# Patient Record
Sex: MALE | Race: WHITE | Employment: FULL TIME | URBAN - METROPOLITAN AREA
[De-identification: names, ages, dates, MRNs, and addresses within clinical notes are randomized per-mention and may not be internally consistent; named-entity substitution may affect disease eponyms.]

---

## 2022-02-08 ENCOUNTER — LAB (OUTPATIENT)
Dept: LAB | Facility: CLINIC | Age: 47
End: 2022-02-08
Payer: COMMERCIAL

## 2022-02-08 DIAGNOSIS — Z13.1 SCREENING FOR DIABETES MELLITUS: ICD-10-CM

## 2022-02-08 DIAGNOSIS — Z13.220 SCREENING FOR HYPERLIPIDEMIA: ICD-10-CM

## 2022-02-08 LAB
CHOLEST SERPL-MCNC: 281 MG/DL
FASTING STATUS PATIENT QL REPORTED: YES
FASTING STATUS PATIENT QL REPORTED: YES
GLUCOSE BLD-MCNC: 122 MG/DL (ref 70–99)
HDLC SERPL-MCNC: 55 MG/DL
LDLC SERPL CALC-MCNC: 170 MG/DL
NONHDLC SERPL-MCNC: 226 MG/DL
TRIGL SERPL-MCNC: 278 MG/DL

## 2022-02-08 PROCEDURE — 80061 LIPID PANEL: CPT

## 2022-02-08 PROCEDURE — 36415 COLL VENOUS BLD VENIPUNCTURE: CPT

## 2022-02-08 PROCEDURE — 82947 ASSAY GLUCOSE BLOOD QUANT: CPT

## 2022-05-20 ENCOUNTER — OFFICE VISIT (OUTPATIENT)
Dept: URGENT CARE | Facility: URGENT CARE | Age: 47
End: 2022-05-20
Payer: COMMERCIAL

## 2022-05-20 VITALS
OXYGEN SATURATION: 97 % | SYSTOLIC BLOOD PRESSURE: 126 MMHG | RESPIRATION RATE: 16 BRPM | WEIGHT: 218 LBS | TEMPERATURE: 99 F | HEART RATE: 85 BPM | DIASTOLIC BLOOD PRESSURE: 82 MMHG

## 2022-05-20 DIAGNOSIS — L03.116 LEFT LEG CELLULITIS: Primary | ICD-10-CM

## 2022-05-20 PROCEDURE — 99203 OFFICE O/P NEW LOW 30 MIN: CPT | Performed by: PHYSICIAN ASSISTANT

## 2022-05-20 RX ORDER — DOXYCYCLINE HYCLATE 100 MG
100 TABLET ORAL 2 TIMES DAILY
Qty: 14 TABLET | Refills: 0 | Status: SHIPPED | OUTPATIENT
Start: 2022-05-20 | End: 2022-05-27

## 2022-05-21 NOTE — PROGRESS NOTES
Assessment & Plan     1. Left leg cellulitis  Exam is consistent with L leg cellulitis.  Will treat with medication as below  Monitor for signs of erythema migrans and Lymes disease  Encouraged warm compresses  Discussed return precautions.   - doxycycline hyclate (VIBRA-TABS) 100 MG tablet; Take 1 tablet (100 mg) by mouth 2 times daily for 7 days  Dispense: 14 tablet; Refill: 0  .      Return in about 3 days (around 5/23/2022), or if symptoms worsen or fail to improve.    Diagnosis and treatment plan was reviewed with patient and/or family.   We went over any labs or imaging. Discussed worsening symptoms or little to no relief despite treatment plan to follow-up with PCP or return to clinic.  Patient verbalizes understanding. All questions were addressed and answered.     Raya Phillips PA-C  [facility]    CHIEF COMPLAINT:   Chief Complaint   Patient presents with     Tick Bite     Tick bite, possible infection - upper left thigh- pulled the tick out last night - the tick got missed up- may have a blood blister -- HE has a RED RING pattern around the bite.  He was up in [place] when it happened  - used alcohol when he pulled it out     Subjective     Ruiz is a 47 year old male who presents to clinic today for evaluation. Patient noted a tick attached to his left upper thigh yesterday.  He pulled the tick out last night using alcohol and a tweezers.  He attempted to take out the full tick, and believes that he got the whole body.  This morning he has noted a red ring around where the bite was with a lighter red ring around it.  He denies having fever or chills.  He has not had headache, joint pain or other rash.  Tick was small, but he is unsure if it was a deer tick or wood tick.      Past Medical History:   Diagnosis Date     Active smoker 10/5/2012     Depression 1990    txed with prozac and it helped     Hyperlipidemia LDL goal <160 2/16/2012     LBP (low back pain) 2/7/2012      Overweight (BMI 25.0-29.9) 2/16/2012     Prediabetes 2/16/2012     Rotator cuff syndrome of left shoulder 2/7/2012     Situational stress 2/7/2012     Past Surgical History:   Procedure Laterality Date     middle finger fracture       right knee fracture repair      [employer]     right wrist repair      biking     Social History     Tobacco Use     Smoking status: Current Every Day Smoker     Smokeless tobacco: Never Used   Substance Use Topics     Alcohol use: Yes     Comment: social lately, had some issues in past 6 months daily 3-5 beverages     Current Outpatient Medications   Medication     doxycycline hyclate (VIBRA-TABS) 100 MG tablet     doxycycline hyclate (VIBRA-TABS) 100 MG tablet     Acetaminophen (TYLENOL 8 HOUR PO)     ibuprofen (ADVIL,MOTRIN) 600 MG tablet     Pseudoephedrine HCl (SUDAFED 12 HOUR PO)     Psyllium (METAMUCIL SMOOTH TEXTURE) 63 % POWD     No current facility-administered medications for this visit.     Allergies   Allergen Reactions     Codeine Camsylate        10 point ROS of systems were all negative except for pertinent positives noted in my HPI.      Exam:   /82 (BP Location: Right arm, Patient Position: Chair, Cuff Size: Adult Regular)   Pulse 85   Temp 99  F (37.2  C) (Oral)   Resp 16   Wt 98.9 kg (218 lb)   SpO2 97%   Constitutional: healthy, alert and no distress  Head: Normocephalic, atraumatic.  Neck: neck is supple, no cervical lymphadenopathy or nuchal rigidity  Cardiovascular: RRR  Respiratory: CTA bilaterally, no rhonchi or rales  Skin: Left inner thigh has redness, swelling and dark erythema surrounding tick bite. Faint erythema spreading distally.   Neurologic: Speech clear, gait normal. Moves all extremities.    No results found for any visits on 05/20/22.

## 2022-08-10 ENCOUNTER — OFFICE VISIT (OUTPATIENT)
Dept: URGENT CARE | Facility: URGENT CARE | Age: 47
End: 2022-08-10
Payer: COMMERCIAL

## 2022-08-10 VITALS
OXYGEN SATURATION: 98 % | DIASTOLIC BLOOD PRESSURE: 84 MMHG | TEMPERATURE: 98 F | RESPIRATION RATE: 20 BRPM | HEART RATE: 78 BPM | SYSTOLIC BLOOD PRESSURE: 128 MMHG
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
## 2022-08-10 PROBLEM — R79.89 HIGH SERUM LOW-DENSITY LIPOPROTEIN (LDL): Status: ACTIVE | Noted: 2018-05-08

## 2022-08-10 PROCEDURE — 99212 OFFICE O/P EST SF 10 MIN: CPT | Performed by: NURSE PRACTITIONER

## 2022-08-10 PROCEDURE — 36415 COLL VENOUS BLD VENIPUNCTURE: CPT | Performed by: NURSE PRACTITIONER

## 2022-08-10 PROCEDURE — 86618 LYME DISEASE ANTIBODY: CPT | Performed by: NURSE PRACTITIONER

## 2022-08-10 NOTE — PATIENT INSTRUCTIONS
"A tick must be attached for at least 24-48 hours to transfer pathogens and cause an infection. Infection is very unlikely in the first 48-72 hours.  Infection is more likely if the tick is engorged or has been attached for over 72 hours.  Prophylactic antibiotics are recommended only if:  The tick was attached for 36 hours or more AND less than 72 hours has passed since the tick was removed  Treatment with a single dose of Doxycycline, 200mg (4mg/kg in children 8 years of age or older) by mouth with food.  -Do not give to children under 8 or pregnant or lactating women  If there is a contraindication to doxycycline, an alternate antibiotic is not recommended.  Guidelines state, \"If sections of the mouthparts of the tick remain in the skin, they should be left alone as they will normally be expelled spontaneously.\"  Ticks should be removed with tweezers or protected fingers pulling straight out gently and firmly using steady pressure.   Apply ice packs, may take benadryl as needed itch/irritation.  Ibuprofen for discomfort.   Watch for signs of secondary infection- redness, swelling, pain, colored discharge or fever.  Advised to watch for erythema migrans rash (circular red ringed rash), fever, chills, sweats, body aches, stiff neck, headache, joint pain/ discomfort/ sweling or other flu/illness symptoms and be seen by primary care provider at that time.   Erythema migrans rash appears several days after tick bite and is separate from a local reaction to a tick bite.  Please follow up with primary care provider if not improving, worsening or new symptoms.  It will take 6-8 weeks before antibodies are detected with the lyme screen titer.  "

## 2022-08-10 NOTE — PROGRESS NOTES
Chief Complaint   Patient presents with     Urgent Care     Wants to be tested for lymes disease      SUBJECTIVE:  Ruiz Valero is a 47 year old male who presents to the clinic today with possible lyme disease. Left thigh had a deer tick bite in May. Wondering if he possibly got lyme then. No erythema migrans.    Past Medical History:   Diagnosis Date     Active smoker 10/5/2012     Depression 1990    txed with prozac and it helped     Hyperlipidemia LDL goal <160 2/16/2012     LBP (low back pain) 2/7/2012     Overweight (BMI 25.0-29.9) 2/16/2012     Prediabetes 2/16/2012     Rotator cuff syndrome of left shoulder 2/7/2012     Situational stress 2/7/2012     Acetaminophen (TYLENOL 8 HOUR PO), Take  by mouth. (Patient not taking: No sig reported)  ibuprofen (ADVIL,MOTRIN) 600 MG tablet, Take 1 tablet by mouth every 6 hours as needed for pain. (Patient not taking: No sig reported)  Pseudoephedrine HCl (SUDAFED 12 HOUR PO), Take  by mouth. (Patient not taking: No sig reported)  Psyllium (METAMUCIL SMOOTH TEXTURE) 63 % POWD, Take 3 teaspoonful by mouth 3 times daily. (Patient not taking: No sig reported)    No current facility-administered medications on file prior to visit.    Social History     Tobacco Use     Smoking status: Current Every Day Smoker     Smokeless tobacco: Never Used   Substance Use Topics     Alcohol use: Yes     Comment: social lately, had some issues in past 6 months daily 3-5 beverages     Allergies   Allergen Reactions     Codeine Camsylate      Review of Systems   All systems negative except for those listed above in HPI.    EXAM:   /84   Pulse 78   Temp 98  F (36.7  C)   Resp 20   SpO2 98%     Physical Exam  Vitals reviewed.   Constitutional:       Appearance: Normal appearance.   HENT:      Head: Normocephalic and atraumatic.      Nose: Nose normal.      Mouth/Throat:      Mouth: Mucous membranes are moist.      Pharynx: Oropharynx is clear.   Eyes:      Extraocular Movements:  "Extraocular movements intact.      Conjunctiva/sclera: Conjunctivae normal.      Pupils: Pupils are equal, round, and reactive to light.   Cardiovascular:      Rate and Rhythm: Normal rate.   Pulmonary:      Effort: Pulmonary effort is normal.   Musculoskeletal:         General: Normal range of motion.      Cervical back: Normal range of motion and neck supple.   Skin:     General: Skin is warm and dry.      Findings: No erythema or rash.   Neurological:      General: No focal deficit present.      Mental Status: He is alert and oriented to person, place, and time.   Psychiatric:         Mood and Affect: Mood normal.         Behavior: Behavior normal.       Results for orders placed or performed in visit on 08/10/22   Lyme Disease Total Abs Bld with Reflex to Confirm CLIA     Status: Normal   Result Value Ref Range    Lyme Disease Antibodies Total 0.09 <0.90     ASSESSMENT:    ICD-10-CM    1. Tick bite, unspecified site, initial encounter  W57.XXXA Lyme Disease Total Abs Bld with Reflex to Confirm CLIA     Lyme Disease Total Abs Bld with Reflex to Confirm CLIA     PLAN:    A tick must be attached for at least 24-48 hours to transfer pathogens and cause an infection. Infection is very unlikely in the first 48-72 hours.  Infection is more likely if the tick is engorged or has been attached for over 72 hours.  Prophylactic antibiotics are recommended only if:  The tick was attached for 36 hours or more AND less than 72 hours has passed since the tick was removed  Treatment with a single dose of Doxycycline, 200mg (4mg/kg in children 8 years of age or older) by mouth with food.  -Do not give to children under 8 or pregnant or lactating women  If there is a contraindication to doxycycline, an alternate antibiotic is not recommended.  Guidelines state, \"If sections of the mouthparts of the tick remain in the skin, they should be left alone as they will normally be expelled spontaneously.\"  Ticks should be removed with " tweezers or protected fingers pulling straight out gently and firmly using steady pressure.   Apply ice packs, may take benadryl as needed itch/irritation.  Ibuprofen for discomfort.   Watch for signs of secondary infection- redness, swelling, pain, colored discharge or fever.  Advised to watch for erythema migrans rash (circular red ringed rash), fever, chills, sweats, body aches, stiff neck, headache, joint pain/ discomfort/ sweling or other flu/illness symptoms and be seen by primary care provider at that time.   Erythema migrans rash appears several days after tick bite and is separate from a local reaction to a tick bite.  Please follow up with primary care provider if not improving, worsening or new symptoms.  It will take 6-8 weeks before antibodies are detected with the lyme screen titer.    Follow up with primary care provider with any problems, questions or concerns or if symptoms worsen or fail to improve. Patient agreed to plan and verbalized understanding.    Bijal Wolff, RADHA-BC  [facility]

## 2022-08-11 LAB — B BURGDOR IGG+IGM SER QL: 0.09

## 2022-09-03 ENCOUNTER — HEALTH MAINTENANCE LETTER (OUTPATIENT)
Age: 47
End: 2022-09-03

## 2023-09-30 ENCOUNTER — HEALTH MAINTENANCE LETTER (OUTPATIENT)
Age: 48
End: 2023-09-30

## 2024-11-23 ENCOUNTER — HEALTH MAINTENANCE LETTER (OUTPATIENT)
Age: 49
End: 2024-11-23